# Patient Record
Sex: MALE | Race: WHITE | NOT HISPANIC OR LATINO | Employment: UNEMPLOYED | URBAN - METROPOLITAN AREA
[De-identification: names, ages, dates, MRNs, and addresses within clinical notes are randomized per-mention and may not be internally consistent; named-entity substitution may affect disease eponyms.]

---

## 2024-06-03 ENCOUNTER — OFFICE VISIT (OUTPATIENT)
Dept: FAMILY MEDICINE CLINIC | Facility: CLINIC | Age: 24
End: 2024-06-03
Payer: COMMERCIAL

## 2024-06-03 VITALS
BODY MASS INDEX: 35 KG/M2 | RESPIRATION RATE: 18 BRPM | HEIGHT: 71 IN | HEART RATE: 83 BPM | DIASTOLIC BLOOD PRESSURE: 76 MMHG | SYSTOLIC BLOOD PRESSURE: 122 MMHG | WEIGHT: 250 LBS | OXYGEN SATURATION: 96 %

## 2024-06-03 DIAGNOSIS — Z00.00 HEALTH MAINTENANCE EXAMINATION: ICD-10-CM

## 2024-06-03 DIAGNOSIS — F41.9 ANXIETY: Primary | ICD-10-CM

## 2024-06-03 DIAGNOSIS — R63.5 WEIGHT GAIN: ICD-10-CM

## 2024-06-03 DIAGNOSIS — R40.0 DAYTIME SOMNOLENCE: ICD-10-CM

## 2024-06-03 PROCEDURE — 99204 OFFICE O/P NEW MOD 45 MIN: CPT | Performed by: NURSE PRACTITIONER

## 2024-06-03 RX ORDER — FLUOXETINE 10 MG/1
10 CAPSULE ORAL DAILY
Qty: 90 CAPSULE | Refills: 1 | Status: SHIPPED | OUTPATIENT
Start: 2024-06-03

## 2024-06-03 RX ORDER — FLUOXETINE HYDROCHLORIDE 20 MG/1
1 CAPSULE ORAL DAILY
COMMUNITY
Start: 2024-05-13 | End: 2024-06-03

## 2024-06-03 NOTE — PATIENT INSTRUCTIONS
Discharge instructions    Lets decrease fluoxetine down to 10 mg daily, you should do nicely with this let me know if you have difficulty or rebound anxiety    Better sleep habits, start doing sleep hygiene  Good sleep habits avoid big meals before bedtime, but also during the day avoid naps during the day if you must and it still early a power nap no more than 20 minutes but typically avoid power naps  8 hours average sleep      Sleep study      Slow steady changes with your diet no crash diets do not work  Healthy heart Mediterranean  Chicken fish, greatly reduced breads Posta sweets,  Casseroles preprepared meals, red meat limits 4 to 6 ounces once a week or so  Consider intermittent fasting 64 ounces water daily slow steady changes for long-lasting weight loss, just improve your health and diet slowly    Encourage you to get out of the house more just go for walks something simple with some things when you schedule    As long as you are doing well, follow-up  4 months,  But if you could update me your progress in 1 month please should you have increased depression severe agitation suicidal ideation emergency room good communication family friends      Alternate fluoxetine 10 mg with 20 mg for 2 weeks then decrease to 10 mg daily for nice smooth transition will see how you are doing now 6 weeks out if you could update me in 6 weeks or so let me know how you doing overall, otherwise I will see you back in 4 months

## 2024-06-03 NOTE — PROGRESS NOTES
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  This tends to be yawning throughout the day,  He has no history of loud snoring but his father does have obstructive sleep apnea.  He is in generally good health no diabetes hypertension no seizure disorder, he is making good decisions in his life.    Social history as above, no tobacco drug alcohol abuse,    Past medical history, as above healthy,    Family history  Grandfather prostate cancer      Anxiety      Objective   Vital Signs:  /76   Pulse 83   Resp 18   Ht 180.3 cm (71\")   Wt 113 kg (250 lb)   SpO2 96%   BMI 34.87 kg/m²   Estimated body mass index is 34.87 kg/m² as calculated from the following:    Height as of this encounter: 180.3 cm (71\").    Weight as of this encounter: 113 kg (250 lb).          Physical Exam  Vitals reviewed.   Constitutional:       Appearance: Normal appearance. He is well-developed.   HENT:      Head: Normocephalic.      Nose: Nose normal.   Eyes:      General: No scleral icterus.     Conjunctiva/sclera: Conjunctivae normal.      Pupils: Pupils are equal, round, and reactive to light.   Neck:      Thyroid: No " thyromegaly.      Vascular: No JVD.   Cardiovascular:      Rate and Rhythm: Normal rate and regular rhythm.      Heart sounds: Normal heart sounds. No murmur heard.     No friction rub. No gallop.   Pulmonary:      Effort: Pulmonary effort is normal. No respiratory distress.      Breath sounds: Normal breath sounds. No stridor. No wheezing or rales.   Abdominal:      General: Bowel sounds are normal. There is no distension.      Palpations: Abdomen is soft.      Tenderness: There is no abdominal tenderness.      Comments: No hepatosplenomegaly, no ascites,   Musculoskeletal:         General: No tenderness.      Cervical back: Neck supple.   Lymphadenopathy:      Cervical: No cervical adenopathy.   Skin:     General: Skin is warm and dry.      Findings: No erythema or rash.   Neurological:      Mental Status: He is alert and oriented to person, place, and time.      Deep Tendon Reflexes: Reflexes are normal and symmetric.   Psychiatric:         Behavior: Behavior normal.         Thought Content: Thought content normal.         Judgment: Judgment normal.        Result Review :                Assessment and Plan   Diagnoses and all orders for this visit:    1. Anxiety (Primary)  -     TSH Rfx On Abnormal To Free T4    2. Daytime somnolence  -     Ambulatory Referral to Sleep Medicine    3. Weight gain    4. Health maintenance examination  -     CBC & Differential  -     Comprehensive Metabolic Panel  -     Lipid Panel With LDL / HDL Ratio  -     TSH Rfx On Abnormal To Free T4  -     Urinalysis With Microscopic If Indicated (No Culture) - Urine, Clean Catch  -     Hemoglobin A1c    Other orders  -     FLUoxetine (PROzac) 10 MG capsule; Take 1 capsule by mouth Daily.  Dispense: 90 capsule; Refill: 1             Follow Up   Return in about 4 months (around 10/3/2024) for Labs today.  Patient was given instructions and counseling regarding his condition or for health maintenance advice. Please see specific information  pulled into the AVS if appropriate.     Patient Instructions     Discharge instructions    Lets decrease fluoxetine down to 10 mg daily, you should do nicely with this let me know if you have difficulty or rebound anxiety    Better sleep habits, start doing sleep hygiene  Good sleep habits avoid big meals before bedtime, but also during the day avoid naps during the day if you must and it still early a power nap no more than 20 minutes but typically avoid power naps  8 hours average sleep      Sleep study      Slow steady changes with your diet no crash diets do not work  Healthy heart Mediterranean  Chicken fish, greatly reduced breads Posta sweets,  Casseroles preprepared meals, red meat limits 4 to 6 ounces once a week or so  Consider intermittent fasting 64 ounces water daily slow steady changes for long-lasting weight loss, just improve your health and diet slowly    Encourage you to get out of the house more just go for walks something simple with some things when you schedule    As long as you are doing well, follow-up  4 months,  But if you could update me your progress in 1 month please should you have increased depression severe agitation suicidal ideation emergency room good communication family friends      Alternate fluoxetine 10 mg with 20 mg for 2 weeks then decrease to 10 mg daily for nice smooth transition will see how you are doing now 6 weeks out if you could update me in 6 weeks or so let me know how you doing overall, otherwise I will see you back in 4 months

## 2024-06-04 LAB
ALBUMIN SERPL-MCNC: 4.7 G/DL (ref 3.5–5.2)
ALBUMIN/GLOB SERPL: 1.4 G/DL
ALP SERPL-CCNC: 117 U/L (ref 39–117)
ALT SERPL-CCNC: 56 U/L (ref 1–41)
APPEARANCE UR: CLEAR
AST SERPL-CCNC: 33 U/L (ref 1–40)
BASOPHILS # BLD AUTO: 0.06 10*3/MM3 (ref 0–0.2)
BASOPHILS NFR BLD AUTO: 0.6 % (ref 0–1.5)
BILIRUB SERPL-MCNC: 0.4 MG/DL (ref 0–1.2)
BILIRUB UR QL STRIP: NEGATIVE
BUN SERPL-MCNC: 12 MG/DL (ref 6–20)
BUN/CREAT SERPL: 12.2 (ref 7–25)
CALCIUM SERPL-MCNC: 9.9 MG/DL (ref 8.6–10.5)
CHLORIDE SERPL-SCNC: 102 MMOL/L (ref 98–107)
CHOLEST SERPL-MCNC: 178 MG/DL (ref 0–200)
CO2 SERPL-SCNC: 24.9 MMOL/L (ref 22–29)
COLOR UR: YELLOW
CREAT SERPL-MCNC: 0.98 MG/DL (ref 0.76–1.27)
EGFRCR SERPLBLD CKD-EPI 2021: 111.8 ML/MIN/1.73
EOSINOPHIL # BLD AUTO: 0.23 10*3/MM3 (ref 0–0.4)
EOSINOPHIL NFR BLD AUTO: 2.2 % (ref 0.3–6.2)
ERYTHROCYTE [DISTWIDTH] IN BLOOD BY AUTOMATED COUNT: 13.3 % (ref 12.3–15.4)
GLOBULIN SER CALC-MCNC: 3.3 GM/DL
GLUCOSE SERPL-MCNC: 89 MG/DL (ref 65–99)
GLUCOSE UR QL STRIP: NEGATIVE
HBA1C MFR BLD: 5.4 % (ref 4.8–5.6)
HCT VFR BLD AUTO: 43.7 % (ref 37.5–51)
HDLC SERPL-MCNC: 51 MG/DL (ref 40–60)
HGB BLD-MCNC: 14.8 G/DL (ref 13–17.7)
HGB UR QL STRIP: NEGATIVE
IMM GRANULOCYTES # BLD AUTO: 0.03 10*3/MM3 (ref 0–0.05)
IMM GRANULOCYTES NFR BLD AUTO: 0.3 % (ref 0–0.5)
KETONES UR QL STRIP: NEGATIVE
LDLC SERPL CALC-MCNC: 107 MG/DL (ref 0–100)
LDLC/HDLC SERPL: 2.06 {RATIO}
LEUKOCYTE ESTERASE UR QL STRIP: NEGATIVE
LYMPHOCYTES # BLD AUTO: 2.69 10*3/MM3 (ref 0.7–3.1)
LYMPHOCYTES NFR BLD AUTO: 25.6 % (ref 19.6–45.3)
MCH RBC QN AUTO: 28.1 PG (ref 26.6–33)
MCHC RBC AUTO-ENTMCNC: 33.9 G/DL (ref 31.5–35.7)
MCV RBC AUTO: 83.1 FL (ref 79–97)
MONOCYTES # BLD AUTO: 0.98 10*3/MM3 (ref 0.1–0.9)
MONOCYTES NFR BLD AUTO: 9.3 % (ref 5–12)
NEUTROPHILS # BLD AUTO: 6.51 10*3/MM3 (ref 1.7–7)
NEUTROPHILS NFR BLD AUTO: 62 % (ref 42.7–76)
NITRITE UR QL STRIP: NEGATIVE
NRBC BLD AUTO-RTO: 0 /100 WBC (ref 0–0.2)
PH UR STRIP: 7 [PH] (ref 5–8)
PLATELET # BLD AUTO: 450 10*3/MM3 (ref 140–450)
POTASSIUM SERPL-SCNC: 4.7 MMOL/L (ref 3.5–5.2)
PROT SERPL-MCNC: 8 G/DL (ref 6–8.5)
PROT UR QL STRIP: NEGATIVE
RBC # BLD AUTO: 5.26 10*6/MM3 (ref 4.14–5.8)
SODIUM SERPL-SCNC: 141 MMOL/L (ref 136–145)
SP GR UR STRIP: 1.02 (ref 1–1.03)
TRIGL SERPL-MCNC: 109 MG/DL (ref 0–150)
TSH SERPL DL<=0.005 MIU/L-ACNC: 0.95 UIU/ML (ref 0.27–4.2)
UROBILINOGEN UR STRIP-MCNC: NORMAL MG/DL
VLDLC SERPL CALC-MCNC: 20 MG/DL (ref 5–40)
WBC # BLD AUTO: 10.5 10*3/MM3 (ref 3.4–10.8)

## 2024-07-23 ENCOUNTER — OFFICE VISIT (OUTPATIENT)
Dept: SLEEP MEDICINE | Facility: HOSPITAL | Age: 24
End: 2024-07-23
Payer: COMMERCIAL

## 2024-07-23 VITALS — HEIGHT: 71 IN | BODY MASS INDEX: 36.06 KG/M2 | WEIGHT: 257.6 LBS | OXYGEN SATURATION: 98 % | HEART RATE: 92 BPM

## 2024-07-23 DIAGNOSIS — E66.01 MORBID (SEVERE) OBESITY DUE TO EXCESS CALORIES: ICD-10-CM

## 2024-07-23 DIAGNOSIS — E66.09 CLASS 2 OBESITY DUE TO EXCESS CALORIES WITH BODY MASS INDEX (BMI) OF 35.0 TO 35.9 IN ADULT, UNSPECIFIED WHETHER SERIOUS COMORBIDITY PRESENT: ICD-10-CM

## 2024-07-23 DIAGNOSIS — R40.0 DAYTIME SOMNOLENCE: Primary | ICD-10-CM

## 2024-07-23 DIAGNOSIS — G47.33 OSA (OBSTRUCTIVE SLEEP APNEA): ICD-10-CM

## 2024-07-23 PROCEDURE — G0463 HOSPITAL OUTPT CLINIC VISIT: HCPCS

## 2024-07-23 PROCEDURE — 99204 OFFICE O/P NEW MOD 45 MIN: CPT | Performed by: PSYCHIATRY & NEUROLOGY

## 2024-07-23 NOTE — PROGRESS NOTES
"  Reason for Consultation: Daytime sleepiness        Patient Care Team:  Epley, James, APRN as PCP - General (Nurse Practitioner)  Maritza Doe MD, MPH as Consulting Physician (Sleep Medicine)      History of present illness:    Thank you for asking me to see your patient.  The patient is a 24 y.o. 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  He is to be able to go to sleep when he got home at 7 AM and sleep till 3 PM and feel rested but now he finds himself waking up repeatedly and even going back to sleep at 6 PM before he is to go back to work at 10 PM.  His father has sleep apnea and snores loudly this patient does not have a compelling collateral history of the same he has gained about 100 pounds in the past 5 years.  Since they moved to a new house with relatively dark and quiet basement he says its helped a little bit to sleep.  He is at the visit with his mother and she provides concordant collateral history.  Has been on Prozac for over a year and would not appear to have temporally been associate with any changes in his sleep.  He denies tobacco use has 2-3 caffeinated beverages per day no alcohol habitual bedtime is between 7 and 8 AM and he would prefer to get up between 3 to 4 PM and he sometimes feels tired.  He has never had a prior sleep study    Gilman: 3    Data Reviewed: Reviewed his sleep medicine questionnaire and medical chart      PMH:  No past medical history on file.       Allergies:  Patient has no known allergies.     Medication Review:   Current Outpatient Medications on File Prior to Visit   Medication Sig Dispense Refill    FLUoxetine (PROzac) 10 MG capsule Take 1 capsule by mouth Daily. 90 capsule 1     No current facility-administered medications on file prior to visit.         Vital Signs:    Vitals:    07/23/24 0900   Pulse: 92   SpO2: 98%   Weight: 117 kg (257 lb 9.6 oz)   Height: 180.3 cm (71\")        Body mass index is 35.93 kg/m².  Neck " Circumference: 17.5 inches  .BMIFOLLOWUP  Class 2 Severe Obesity (BMI >=35 and <=39.9). Obesity-related health conditions include the following: obstructive sleep apnea. Obesity is newly identified. BMI is is above average; BMI management plan is completed. We discussed portion control and increasing exercise.      Physical Exam:    Constitutional:  Well developed 24 y.o. male that appears in no apparent distress.  Awake & oriented times 3.  Normal mood with normal recent and remote memory and normal judgement.  Eyes:  Conjunctivae normal.  Oropharynx: Moist mucous membranes without exudate and Mallampati 4 with macroglossia  Neck: Trachea midline  Respiratory: Effort is not labored  Cardiovascular: Radial pulse regular  Musculoskeletal: Gait appears normal, no digital clubbing evident, no pre-tibial edema        Impression:   Encounter Diagnoses   Name Primary?    Daytime somnolence Yes    Morbid (severe) obesity due to excess calories     Class 2 obesity due to excess calories with body mass index (BMI) of 35.0 to 35.9 in adult, unspecified whether serious comorbidity present     NOHEMI (obstructive sleep apnea)      Patient's BMI is Body mass index is 35.93 kg/m².    I think given the patient's anatomy and the description it is quite likely that he will have moderately severe obstructive sleep apnea or worse.  Home    Plan:    Home sleep apnea test    The patient should practice good sleep hygiene measures.      Weight loss might be beneficial in this patient who has a Body mass index is 35.93 kg/m².      Pathophysiology of NOHEMI described to the patient.  Cardiovascular complications of untreated NOHEMI also reviewed.      The patient was cautioned about the dangers of drowsy driving.    Desean Doe MD  Sleep Medicine  07/23/24  10:51 EDT

## 2024-08-01 ENCOUNTER — TELEPHONE (OUTPATIENT)
Dept: SLEEP MEDICINE | Facility: HOSPITAL | Age: 24
End: 2024-08-01
Payer: COMMERCIAL

## 2024-08-02 ENCOUNTER — HOSPITAL ENCOUNTER (OUTPATIENT)
Dept: SLEEP MEDICINE | Facility: HOSPITAL | Age: 24
Discharge: HOME | End: 2024-08-02
Admitting: PSYCHIATRY & NEUROLOGY
Payer: COMMERCIAL

## 2024-08-02 PROCEDURE — 95806 SLEEP STUDY UNATT&RESP EFFT: CPT

## 2024-08-06 DIAGNOSIS — G47.33 OBSTRUCTIVE SLEEP APNEA, ADULT: Primary | ICD-10-CM

## 2024-08-06 PROCEDURE — 95806 SLEEP STUDY UNATT&RESP EFFT: CPT | Performed by: PSYCHIATRY & NEUROLOGY

## 2024-08-08 ENCOUNTER — TELEPHONE (OUTPATIENT)
Dept: SLEEP MEDICINE | Facility: HOSPITAL | Age: 24
End: 2024-08-08
Payer: COMMERCIAL

## 2024-08-08 NOTE — TELEPHONE ENCOUNTER
..Spoke with patient about sleep study results , sending orders to [facility], compliance follow up schedule9/9/24.

## 2024-10-07 ENCOUNTER — OFFICE VISIT (OUTPATIENT)
Dept: FAMILY MEDICINE CLINIC | Facility: CLINIC | Age: 24
End: 2024-10-07
Payer: COMMERCIAL

## 2024-10-07 VITALS
WEIGHT: 258.9 LBS | BODY MASS INDEX: 36.24 KG/M2 | HEART RATE: 86 BPM | DIASTOLIC BLOOD PRESSURE: 86 MMHG | SYSTOLIC BLOOD PRESSURE: 122 MMHG | HEIGHT: 71 IN | OXYGEN SATURATION: 97 %

## 2024-10-07 DIAGNOSIS — G47.33 MILD OBSTRUCTIVE SLEEP APNEA: ICD-10-CM

## 2024-10-07 DIAGNOSIS — F41.9 ANXIETY: Primary | ICD-10-CM

## 2024-10-07 PROCEDURE — 99213 OFFICE O/P EST LOW 20 MIN: CPT | Performed by: NURSE PRACTITIONER

## 2024-10-07 RX ORDER — FLUOXETINE 10 MG/1
10 CAPSULE ORAL DAILY
Qty: 90 CAPSULE | Refills: 1 | Status: SHIPPED | OUTPATIENT
Start: 2024-10-07

## 2024-10-07 NOTE — PATIENT INSTRUCTIONS
Discharge instructions continue fluoxetine you doing spectacular,  Excellent idea I will start a workout routine,  Challenge you 10 to 20 pounds over the  next 6 to 12 months  Slow steady changes, more fiber, smaller portion size,  No diets that do not work just good health consider intermittent fasting      Lungs are doing well and feeling good I will see back in 4 months, sooner for any problems

## 2024-10-07 NOTE — PROGRESS NOTES
"Chief Complaint  Anxiety    Subjective        Laron Dixon presents to [facility]  History of Present Illness  Very pleasant gentleman here today follow-up anxiety, he was having some daytime somnolence, and discussions about fluoxetine the best dose for him, we decreased it down to 10 mg daily he is doing well with this, he has a potentially stressful job as a   He is doing well with his occupation, he does work swing shift and night shift,  And attributes to some of his daytime somnolence due to his sleeping schedule he feels better when his schedule is different did not do well with the mask, but also did not notice it helped him a whole lot either his numbers were borderline with this testing obstructive sleep apnea, he prefers no mask into management otherwise he does sleep on his side or his abdomen which helps      Social history no drug alcohol tobacco abuse he is making good decisions in his life,  Plans on going back to the gym soon  Anxiety      Objective   Vital Signs:  /86 (BP Location: Left arm, Patient Position: Sitting, Cuff Size: Large Adult)   Pulse 86   Ht 180.3 cm (71\")   Wt 117 kg (258 lb 14.4 oz)   SpO2 97%   BMI 36.11 kg/m²   Estimated body mass index is 36.11 kg/m² as calculated from the following:    Height as of this encounter: 180.3 cm (71\").    Weight as of this encounter: 117 kg (258 lb 14.4 oz).            Physical Exam  Constitutional:       General: He is not in acute distress.     Appearance: Normal appearance. He is not ill-appearing, toxic-appearing or diaphoretic.   Eyes:      Conjunctiva/sclera: Conjunctivae normal.      Pupils: Pupils are equal, round, and reactive to light.   Pulmonary:      Effort: Pulmonary effort is normal. No respiratory distress.   Skin:     General: Skin is warm and dry.   Neurological:      General: No focal deficit present.      Mental Status: Mental status is at baseline.   Psychiatric:         Mood " and Affect: Mood normal.         Behavior: Behavior normal.         Thought Content: Thought content normal.         Judgment: Judgment normal.        Result Review :                Assessment and Plan   Diagnoses and all orders for this visit:    1. Anxiety (Primary)    2. Mild obstructive sleep apnea  Comments:  Did not tolerate mask well did not feel better, opted no mask    Other orders  -     FLUoxetine (PROzac) 10 MG capsule; Take 1 capsule by mouth Daily.  Dispense: 90 capsule; Refill: 1             Follow Up   Return in about 4 months (around 2/7/2025).  Patient was given instructions and counseling regarding his condition or for health maintenance advice. Please see specific information pulled into the AVS if appropriate.     Patient Instructions   Discharge instructions continue fluoxetine you doing spectacular,  Excellent idea I will start a workout routine,  Challenge you 10 to 20 pounds over the  next 6 to 12 months  Slow steady changes, more fiber, smaller portion size,  No diets that do not work just good health consider intermittent fasting      Lungs are doing well and feeling good I will see back in 4 months, sooner for any problems

## 2024-11-14 ENCOUNTER — OFFICE VISIT (OUTPATIENT)
Dept: FAMILY MEDICINE CLINIC | Facility: CLINIC | Age: 24
End: 2024-11-14
Payer: COMMERCIAL

## 2024-11-14 VITALS
OXYGEN SATURATION: 96 % | BODY MASS INDEX: 36.4 KG/M2 | WEIGHT: 260 LBS | SYSTOLIC BLOOD PRESSURE: 130 MMHG | TEMPERATURE: 98.3 F | DIASTOLIC BLOOD PRESSURE: 84 MMHG | HEIGHT: 71 IN | HEART RATE: 101 BPM

## 2024-11-14 DIAGNOSIS — J02.0 STREP THROAT: Primary | ICD-10-CM

## 2024-11-14 PROCEDURE — 99214 OFFICE O/P EST MOD 30 MIN: CPT | Performed by: NURSE PRACTITIONER

## 2024-11-14 RX ORDER — AMOXICILLIN 500 MG/1
500 CAPSULE ORAL 2 TIMES DAILY
Qty: 20 CAPSULE | Refills: 0 | Status: SHIPPED | OUTPATIENT
Start: 2024-11-14

## 2024-11-14 NOTE — PATIENT INSTRUCTIONS
Probably strep.  Test was borderline positive  Amoxil twice a day push fluids change toothbrush in 2 days, if severe pain unable to swallow lethargy high fever chest pain shortness of breath emergency room  Ibuprofen 600 or 800     3 times a day until better if you develop more runny nose cough congestion in the next couple days then this would suggest more of a viral illness nonetheless hydrate well and treat symptoms and update me your progress if not improving next week        Okay to return to work Saturday

## 2024-11-14 NOTE — PROGRESS NOTES
"Chief Complaint  URI (Sore throat started Wednesday morning /Vomiting and congestion started Tuesday )    Subjective        Laron Dixon presents to [facility]  History of Present Illness  Pleasant gentleman complains of couple days of sore throat, vomited once, no new nasal congestion no new cough  No recent headache no fever body aches, had strep quite a few years ago,  Somebody at work had strep\        URI       Objective   Vital Signs:  /84   Pulse 101   Temp 98.3 °F (36.8 °C) (Oral)   Ht 180.3 cm (71\")   Wt 118 kg (260 lb)   SpO2 96%   BMI 36.26 kg/m²   Estimated body mass index is 36.26 kg/m² as calculated from the following:    Height as of this encounter: 180.3 cm (71\").    Weight as of this encounter: 118 kg (260 lb).            Physical Exam  Constitutional:       General: He is not in acute distress.     Appearance: Normal appearance. He is not ill-appearing, toxic-appearing or diaphoretic.   HENT:      Ears:      Comments: Partial wax impaction especially the left TMs are clear turbinates congested pharynx is clear no excessive postnasal drip trace erythema posterior pharynx and tonsils, uvula midline no abscess no exudate neck supple     Nose: Nose normal. Congestion present.      Mouth/Throat:      Mouth: Mucous membranes are moist.      Pharynx: Oropharynx is clear. No oropharyngeal exudate or posterior oropharyngeal erythema.   Eyes:      Conjunctiva/sclera: Conjunctivae normal.      Pupils: Pupils are equal, round, and reactive to light.   Cardiovascular:      Rate and Rhythm: Normal rate and regular rhythm.   Pulmonary:      Effort: Pulmonary effort is normal. No respiratory distress.   Lymphadenopathy:      Cervical: No cervical adenopathy.   Skin:     General: Skin is warm and dry.      Capillary Refill: Capillary refill takes less than 2 seconds.   Neurological:      General: No focal deficit present.      Mental Status: Mental status is at baseline. "   Psychiatric:         Mood and Affect: Mood normal.         Thought Content: Thought content normal.         Judgment: Judgment normal.        Result Review :                Assessment and Plan   Diagnoses and all orders for this visit:    1. Strep throat (Primary)    Other orders  -     amoxicillin (AMOXIL) 500 MG capsule; Take 1 capsule by mouth 2 (Two) Times a Day.  Dispense: 20 capsule; Refill: 0             Follow Up   Return rtn work Sat note plse.  Patient was given instructions and counseling regarding his condition or for health maintenance advice. Please see specific information pulled into the AVS if appropriate.     Patient Instructions   Probably strep.  Test was borderline positive  Amoxil twice a day push fluids change toothbrush in 2 days, if severe pain unable to swallow lethargy high fever chest pain shortness of breath emergency room  Ibuprofen 600 or 800     3 times a day until better if you develop more runny nose cough congestion in the next couple days then this would suggest more of a viral illness nonetheless hydrate well and treat symptoms and update me your progress if not improving next week        Okay to return to work Saturday

## 2025-02-17 ENCOUNTER — OFFICE VISIT (OUTPATIENT)
Dept: FAMILY MEDICINE CLINIC | Facility: CLINIC | Age: 25
End: 2025-02-17
Payer: COMMERCIAL

## 2025-02-17 VITALS
DIASTOLIC BLOOD PRESSURE: 78 MMHG | HEIGHT: 71 IN | SYSTOLIC BLOOD PRESSURE: 126 MMHG | BODY MASS INDEX: 37.34 KG/M2 | WEIGHT: 266.7 LBS | HEART RATE: 95 BPM | OXYGEN SATURATION: 97 %

## 2025-02-17 DIAGNOSIS — E66.812 CLASS 2 SEVERE OBESITY DUE TO EXCESS CALORIES WITH SERIOUS COMORBIDITY AND BODY MASS INDEX (BMI) OF 37.0 TO 37.9 IN ADULT: ICD-10-CM

## 2025-02-17 DIAGNOSIS — F41.9 ANXIETY: Primary | ICD-10-CM

## 2025-02-17 DIAGNOSIS — E66.01 CLASS 2 SEVERE OBESITY DUE TO EXCESS CALORIES WITH SERIOUS COMORBIDITY AND BODY MASS INDEX (BMI) OF 37.0 TO 37.9 IN ADULT: ICD-10-CM

## 2025-02-17 DIAGNOSIS — Z00.00 HEALTH MAINTENANCE EXAMINATION: ICD-10-CM

## 2025-02-17 PROCEDURE — 99214 OFFICE O/P EST MOD 30 MIN: CPT | Performed by: NURSE PRACTITIONER

## 2025-02-17 RX ORDER — FLUOXETINE 10 MG/1
10 CAPSULE ORAL DAILY
Qty: 90 CAPSULE | Refills: 1 | Status: SHIPPED | OUTPATIENT
Start: 2025-02-17

## 2025-02-17 RX ORDER — CLINDAMYCIN PHOSPHATE 10 MG/G
1 GEL TOPICAL 2 TIMES DAILY
Qty: 180 G | Refills: 3 | Status: SHIPPED | OUTPATIENT
Start: 2025-02-17

## 2025-02-17 NOTE — PATIENT INSTRUCTIONS
Discharge instruction    Continue healthy diet regular exercise, decrease breads Posta sweets, walking daily but decrease portion control to  Get slow steady weight loss, we need to repeat your liver test before next appointment if still elevated could be fatty liver?  Will refer you to gastro and get an ultrasound if so    Continue present medication you are doing great,  Thankfully no severe sleep apnea    Good acting care, a simple gentle scrub once a day in the shower  Followed by some oil free acne moisturizer care from [employer] or similar with sal acid   acid or benzyl peroxide   Low potency,  Mix with a thin layer of benzyl peroxide gel as well and over several weeks or months that should continue to improve if acute inflammation you may need oral steroids or see dermatology anytime    Follow-up physical 4 months, and lab

## 2025-02-17 NOTE — PROGRESS NOTES
"Chief Complaint  Follow-up (4 mo)    Subjective        Laron Dixon presents to 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  He has some snoring, intermittently some daytime somnolence is better mostly, if he changes sleep position, he does well and not having typically daytime somnolence if he gets his rest  Did  a little bit of weight since last visit gradual increase in weight over  The last several years,  Likes to drink milk but not excessively  Motivated for his health  No drug alcohol tobacco abuse diet and exercise    On physical exam noted he has some acne on his back patient reports this quite a few years is actually better        Objective   Vital Signs:  /78 (BP Location: Left arm, Patient Position: Sitting, Cuff Size: Large Adult)   Pulse 95   Ht 180.3 cm (70.98\")   Wt 121 kg (266 lb 11.2 oz)   SpO2 97%   BMI 37.21 kg/m²   Estimated body mass index is 37.21 kg/m² as calculated from the following:    Height as of this encounter: 180.3 cm (70.98\").    Weight as of this encounter: 121 kg (266 lb 11.2 oz).            Physical Exam  Vitals reviewed.   Constitutional:       General: He is not in acute distress.     Appearance: Normal appearance. He is well-developed. He is not ill-appearing, toxic-appearing or diaphoretic.   HENT:      Head: Normocephalic.      Nose: Nose normal.   Eyes:      General: No scleral icterus.     Conjunctiva/sclera: Conjunctivae normal.      Pupils: Pupils are equal, round, and reactive to light.   Neck:      Thyroid: No thyromegaly.      Vascular: No JVD.   Cardiovascular:      Rate and Rhythm: Normal rate and regular rhythm.   Pulmonary:      Effort: No respiratory distress.   Abdominal:      General: Bowel sounds are normal. " There is no distension.      Palpations: Abdomen is soft.      Tenderness: There is no abdominal tenderness.      Comments: No hepatosplenomegaly, no ascites,   Musculoskeletal:         General: No tenderness.      Cervical back: Neck supple.   Lymphadenopathy:      Cervical: No cervical adenopathy.   Skin:     General: Skin is warm and dry.      Findings: No erythema or rash.      Comments: Mid and upper back mild to moderate acne, some old scarring,  No cellulitis or severe disease  Face is fairly clear   Neurological:      General: No focal deficit present.      Mental Status: He is alert and oriented to person, place, and time. Mental status is at baseline.      Deep Tendon Reflexes: Reflexes are normal and symmetric.   Psychiatric:         Mood and Affect: Mood normal.         Behavior: Behavior normal.         Thought Content: Thought content normal.         Judgment: Judgment normal.        Result Review :                Assessment and Plan   Diagnoses and all orders for this visit:    1. Anxiety (Primary)    2. Health maintenance examination  -     CBC & Differential; Future  -     Comprehensive Metabolic Panel; Future  -     Lipid Panel With LDL / HDL Ratio; Future  -     TSH Rfx On Abnormal To Free T4; Future  -     Urinalysis With Culture If Indicated -; Future    3. Class 2 severe obesity due to excess calories with serious comorbidity and body mass index (BMI) of 37.0 to 37.9 in adult    Other orders  -     clindamycin (Clindamycin 1% external gel) 1 % gel; Apply 1 Application topically to the appropriate area as directed 2 (Two) Times a Day. Use sparingly to affected area  Dispense: 180 g; Refill: 3  -     FLUoxetine (PROzac) 10 MG capsule; Take 1 capsule by mouth Daily.  Dispense: 90 capsule; Refill: 1             Follow Up   Return for Annual physical, Labs before next visit.  Patient was given instructions and counseling regarding his condition or for health maintenance advice. Please see specific  information pulled into the AVS if appropriate.       Patient to focus on diet more, lots of vegetables taken for 64 ounces water daily focus on calories around 1500 -1800-calorie   intermittent fasting    Milk ok for  adults moderation taken account for calories   Keep calories 1500 less than 1800    If patient still struggling consider Wegovy Zepbound  Gastric sleeve Qsymia possibly,        Patient Instructions   Discharge instruction    Continue healthy diet regular exercise, decrease breads Posta sweets, walking daily but decrease portion control to  Get slow steady weight loss, we need to repeat your liver test before next appointment if still elevated could be fatty liver?  Will refer you to gastro and get an ultrasound if so    Continue present medication you are doing great,  Thankfully no severe sleep apnea    Good acting care, a simple gentle scrub once a day in the shower  Followed by some oil free acne moisturizer care from Neutrogena or similar with sal acid   acid or benzyl peroxide   Low potency,  Mix with a thin layer of benzyl peroxide gel as well and over several weeks or months that should continue to improve if acute inflammation you may need oral steroids or see dermatology anytime    Follow-up physical 4 months, and lab

## 2025-06-12 ENCOUNTER — TELEPHONE (OUTPATIENT)
Dept: FAMILY MEDICINE CLINIC | Facility: CLINIC | Age: 25
End: 2025-06-12
Payer: COMMERCIAL

## 2025-06-12 DIAGNOSIS — Z11.59 NEED FOR HEPATITIS C SCREENING TEST: ICD-10-CM

## 2025-06-12 DIAGNOSIS — Z00.00 HEALTH MAINTENANCE EXAMINATION: ICD-10-CM

## 2025-06-12 PROBLEM — L05.91 PILONIDAL CYST: Status: ACTIVE | Noted: 2021-06-14

## 2025-06-13 DIAGNOSIS — Z11.59 NEED FOR HEPATITIS C SCREENING TEST: ICD-10-CM

## 2025-06-17 LAB
ALBUMIN SERPL-MCNC: 4.6 G/DL (ref 3.5–5.2)
ALBUMIN/GLOB SERPL: 1.3 G/DL
ALP SERPL-CCNC: 113 U/L (ref 39–117)
ALT SERPL-CCNC: 55 U/L (ref 1–41)
AST SERPL-CCNC: 34 U/L (ref 1–40)
BASOPHILS # BLD AUTO: 0.08 10*3/MM3 (ref 0–0.2)
BASOPHILS NFR BLD AUTO: 0.7 % (ref 0–1.5)
BILIRUB SERPL-MCNC: 0.3 MG/DL (ref 0–1.2)
BUN SERPL-MCNC: 10 MG/DL (ref 6–20)
BUN/CREAT SERPL: 9.3 (ref 7–25)
CALCIUM SERPL-MCNC: 9.9 MG/DL (ref 8.6–10.5)
CHLORIDE SERPL-SCNC: 104 MMOL/L (ref 98–107)
CHOLEST SERPL-MCNC: 183 MG/DL (ref 0–200)
CO2 SERPL-SCNC: 25.7 MMOL/L (ref 22–29)
CREAT SERPL-MCNC: 1.07 MG/DL (ref 0.76–1.27)
EGFRCR SERPLBLD CKD-EPI 2021: 100 ML/MIN/1.73
EOSINOPHIL # BLD AUTO: 0.27 10*3/MM3 (ref 0–0.4)
EOSINOPHIL NFR BLD AUTO: 2.5 % (ref 0.3–6.2)
ERYTHROCYTE [DISTWIDTH] IN BLOOD BY AUTOMATED COUNT: 12.7 % (ref 12.3–15.4)
GLOBULIN SER CALC-MCNC: 3.6 GM/DL
GLUCOSE SERPL-MCNC: 96 MG/DL (ref 65–99)
HCT VFR BLD AUTO: 42.8 % (ref 37.5–51)
HCV IGG SERPL QL IA: NON REACTIVE
HDLC SERPL-MCNC: 50 MG/DL (ref 40–60)
HGB BLD-MCNC: 14.2 G/DL (ref 13–17.7)
IMM GRANULOCYTES # BLD AUTO: 0.03 10*3/MM3 (ref 0–0.05)
IMM GRANULOCYTES NFR BLD AUTO: 0.3 % (ref 0–0.5)
LDLC SERPL CALC-MCNC: 120 MG/DL (ref 0–100)
LDLC/HDLC SERPL: 2.39 {RATIO}
LYMPHOCYTES # BLD AUTO: 4.72 10*3/MM3 (ref 0.7–3.1)
LYMPHOCYTES NFR BLD AUTO: 43.5 % (ref 19.6–45.3)
MCH RBC QN AUTO: 27.8 PG (ref 26.6–33)
MCHC RBC AUTO-ENTMCNC: 33.2 G/DL (ref 31.5–35.7)
MCV RBC AUTO: 83.9 FL (ref 79–97)
MONOCYTES # BLD AUTO: 0.77 10*3/MM3 (ref 0.1–0.9)
MONOCYTES NFR BLD AUTO: 7.1 % (ref 5–12)
NEUTROPHILS # BLD AUTO: 4.99 10*3/MM3 (ref 1.7–7)
NEUTROPHILS NFR BLD AUTO: 45.9 % (ref 42.7–76)
NRBC BLD AUTO-RTO: 0 /100 WBC (ref 0–0.2)
PLATELET # BLD AUTO: 447 10*3/MM3 (ref 140–450)
POTASSIUM SERPL-SCNC: 4.8 MMOL/L (ref 3.5–5.2)
PROT SERPL-MCNC: 8.2 G/DL (ref 6–8.5)
RBC # BLD AUTO: 5.1 10*6/MM3 (ref 4.14–5.8)
SODIUM SERPL-SCNC: 141 MMOL/L (ref 136–145)
TRIGL SERPL-MCNC: 68 MG/DL (ref 0–150)
TSH SERPL DL<=0.005 MIU/L-ACNC: 2.27 UIU/ML (ref 0.27–4.2)
UNABLE TO VOID: NORMAL
VLDLC SERPL CALC-MCNC: 13 MG/DL (ref 5–40)
WBC # BLD AUTO: 10.86 10*3/MM3 (ref 3.4–10.8)

## 2025-06-23 ENCOUNTER — OFFICE VISIT (OUTPATIENT)
Dept: FAMILY MEDICINE CLINIC | Facility: CLINIC | Age: 25
End: 2025-06-23
Payer: COMMERCIAL

## 2025-06-23 VITALS
WEIGHT: 265.2 LBS | DIASTOLIC BLOOD PRESSURE: 80 MMHG | OXYGEN SATURATION: 99 % | SYSTOLIC BLOOD PRESSURE: 138 MMHG | BODY MASS INDEX: 37.13 KG/M2 | HEIGHT: 71 IN | HEART RATE: 97 BPM

## 2025-06-23 DIAGNOSIS — F41.9 ANXIETY: ICD-10-CM

## 2025-06-23 DIAGNOSIS — R03.0 PREHYPERTENSION: ICD-10-CM

## 2025-06-23 DIAGNOSIS — L30.9 DERMATITIS: Primary | ICD-10-CM

## 2025-06-23 DIAGNOSIS — L70.0 ACNE VULGARIS: ICD-10-CM

## 2025-06-23 DIAGNOSIS — E66.01 CLASS 2 SEVERE OBESITY DUE TO EXCESS CALORIES WITH SERIOUS COMORBIDITY AND BODY MASS INDEX (BMI) OF 37.0 TO 37.9 IN ADULT: ICD-10-CM

## 2025-06-23 DIAGNOSIS — Z00.00 HEALTH MAINTENANCE EXAMINATION: ICD-10-CM

## 2025-06-23 DIAGNOSIS — E66.812 CLASS 2 SEVERE OBESITY DUE TO EXCESS CALORIES WITH SERIOUS COMORBIDITY AND BODY MASS INDEX (BMI) OF 37.0 TO 37.9 IN ADULT: ICD-10-CM

## 2025-06-23 DIAGNOSIS — G47.33 MILD OBSTRUCTIVE SLEEP APNEA: ICD-10-CM

## 2025-06-23 DIAGNOSIS — R79.89 ELEVATED LIVER FUNCTION TESTS: ICD-10-CM

## 2025-06-23 PROCEDURE — 99395 PREV VISIT EST AGE 18-39: CPT | Performed by: NURSE PRACTITIONER

## 2025-06-23 RX ORDER — CLINDAMYCIN PHOSPHATE 10 UG/ML
LOTION TOPICAL 2 TIMES DAILY
Qty: 180 ML | Refills: 3 | Status: SHIPPED | OUTPATIENT
Start: 2025-06-23

## 2025-06-23 RX ORDER — DOXYCYCLINE 100 MG/1
100 CAPSULE ORAL 2 TIMES DAILY
Qty: 28 CAPSULE | Refills: 0 | Status: SHIPPED | OUTPATIENT
Start: 2025-06-23

## 2025-06-23 NOTE — PATIENT INSTRUCTIONS
Discharge instructions    For acne,  Use benzyl peroxide or, Neutrogena  Neutrogena oil free acne moisturizer or grapefruit with salicylate acid  Use this in combination with  Clindamycin lotion and over-the-counter   Differin  Lotion    Basically one third of each  Quickly mix together and apply a very thin layer okay to wipe off excess upper chest shoulders and back and face  Looks like you only need a little bit around the nose and you do not need likely any chronic treatment of your face      Once again, I agree with you you are doing great  10 mg every other day for 6 weeks  Update me your progress then we can discontinue this  After you stop the medicine update me on an additional 6 weeks later then I will see you yearly    If you need to see dermatology anytime let me know    If you continue to have any difficulties with your incision follow-up with surgery but today you have no evidence of any abscess  Pilonidal abscess  Looks like you are motivated for health keep this up    Slow steady changes, to avoid fatty liver,  Your liver test are elevated will need a workup to evaluate you for fatty liver

## 2025-06-23 NOTE — PROGRESS NOTES
"Chief Complaint  Annual Exam    Subjective        Laron Dixon presents to [facility]  History of Present Illness  Here today for health maintenance,  No acute problems chest pain shortness of breath doing well night sweats unexplained weight loss chronic anxiety, fluoxetine has helped substantially but, someone recently left his workplace since anxiety has really decreased he is already started decreasing fluoxetine to 10 mg daily  He would like to wean off completely over the next several weeks    No increased depression worry or anxiety or other issues  Chronic acne [address] back and around his cheeks not fill recently medication for this clindamycin gel  Liver test chronic elevated ALT  No alcohol abuse no tobacco abuse or drug abuse making good decisions  professional  Enjoys his job obstructive sleep apnea started CPAP feels better  Obesity, with comorbidities including elevated liver test risk of fatty liver obstructive sleep apnea   up 5 pounds over 6 months        Objective   Vital Signs:  /80   Pulse 97   Ht 180.3 cm (70.98\")   Wt 120 kg (265 lb 3.2 oz)   SpO2 99%   BMI 37.00 kg/m²   Estimated body mass index is 37 kg/m² as calculated from the following:    Height as of this encounter: 180.3 cm (70.98\").    Weight as of this encounter: 120 kg (265 lb 3.2 oz).            Physical Exam  Vitals reviewed.   Constitutional:       General: He is not in acute distress.     Appearance: Normal appearance. He is well-developed. He is obese. He is not ill-appearing, toxic-appearing or diaphoretic.   HENT:      Head: Normocephalic.      Nose: Nose normal.   Eyes:      General: No scleral icterus.     Conjunctiva/sclera: Conjunctivae normal.      Pupils: Pupils are equal, round, and reactive to light.   Neck:      Thyroid: No thyromegaly.      Vascular: No JVD.   Cardiovascular:      Rate and Rhythm: Normal rate and regular rhythm.      Heart sounds: Normal heart " sounds. No murmur heard.     No friction rub. No gallop.   Pulmonary:      Effort: Pulmonary effort is normal. No respiratory distress.      Breath sounds: Normal breath sounds. No stridor. No wheezing or rales.   Abdominal:      General: Bowel sounds are normal. There is no distension.      Palpations: Abdomen is soft.      Tenderness: There is no abdominal tenderness.      Comments: No hepatosplenomegaly, no ascites,   Musculoskeletal:         General: No tenderness.      Cervical back: Neck supple.   Lymphadenopathy:      Cervical: No cervical adenopathy.   Skin:     General: Skin is warm and dry.      Findings: No erythema or rash.      Comments: Acne  Scattered condoms erythemic base, resolving lesions, old scar tissue back and upper shoulders upper chest  Mild to moderate  More milder on his nasal bridge nose upper cheeks discrete without scarring upper buttocks, at incision site skin fold, mild irritation scab with some mild tenderness without surrounding thickening or abscess       Neurological:      General: No focal deficit present.      Mental Status: He is alert and oriented to person, place, and time. Mental status is at baseline.      Deep Tendon Reflexes: Reflexes are normal and symmetric.   Psychiatric:         Mood and Affect: Mood normal.         Behavior: Behavior normal.         Thought Content: Thought content normal.         Judgment: Judgment normal.        Result Review :                Assessment and Plan   Diagnoses and all orders for this visit:    1. Dermatitis (Primary)  Comments:  With scab without evidence of abscess but irritated upper buttock    2. Health maintenance examination    3. Prehypertension    4. Acne vulgaris    5. Elevated liver function tests  Comments:  At risk for fatty liver refer to gastro and ultrasound  Orders:  -     Ambulatory Referral to Gastroenterology  -     US Liver    6. Class 2 severe obesity due to excess calories with serious comorbidity and body mass  index (BMI) of 37.0 to 37.9 in adult    7. Anxiety    8. Mild obstructive sleep apnea    Other orders  -     doxycycline (VIBRAMYCIN) 100 MG capsule; Take 1 capsule by mouth 2 (Two) Times a Day.  Dispense: 28 capsule; Refill: 0  -     clindamycin (CLEOCIN T) 1 % lotion; Apply  topically to the appropriate area as directed 2 (Two) Times a Day. Routinely for acne back shoulders face  Dispense: 180 mL; Refill: 3             Follow Up   Return in about 1 year (around 6/23/2026) for Annual physical, Labs before next visit.  Patient was given instructions and counseling regarding his condition or for health maintenance advice. Please see specific information pulled into the AVS if appropriate.     Doxycycline for the irritation is upper buttock region there is no evidence of any recurrent abscess  This is likely more superficial of the incision line for any underlying abscess although should he have increased pain redness swelling emergency room follow-up with surgery  Skin care clindamycin, over-the-counter topical salicylate acid  Differin gel  Daily care at least once or twice a day  Dermatology if not improving  Encouraged regarding improved diet,  Elevated liver test and the risk of fatty liver and needing to be aggressive at this at a early age to prevent scar tissue cirrhosis  Prehypertension as well hypertension  Not sure if I could reach him here, as I suspect the discussion referral to gastro may have suppressed him a bit?  He will need to see gastroenterology to follow liver test we should be aggressive in discovering why they are elevated if felt to be fatty liver aggressive in therapy treatment at the minimum 5 to 10% weight loss is indicated to decrease risk of developing fibrosis cirrhosis and he may need more?  Calories around 1500 to 1800 maryjo  Previously discussed other treatment options for obesity.          Patient Instructions   Discharge instructions    For acne,  Use benzyl peroxide or,  Neutrogena  Neutrogena oil free acne moisturizer or grapefruit with salicylate acid  Use this in combination with  Clindamycin lotion and over-the-counter   Differin  Lotion    Basically one third of each  Quickly mix together and apply a very thin layer okay to wipe off excess upper chest shoulders and back and face  Looks like you only need a little bit around the nose and you do not need likely any chronic treatment of your face      Once again, I agree with you you are doing great  10 mg every other day for 6 weeks  Update me your progress then we can discontinue this  After you stop the medicine update me on an additional 6 weeks later then I will see you yearly    If you need to see dermatology anytime let me know    If you continue to have any difficulties with your incision follow-up with surgery but today you have no evidence of any abscess  Pilonidal abscess  Looks like you are motivated for health keep this up    Slow steady changes, to avoid fatty liver,  Your liver test are elevated will need a workup to evaluate you for fatty liver

## 2025-07-14 ENCOUNTER — HOSPITAL ENCOUNTER (OUTPATIENT)
Dept: ULTRASOUND IMAGING | Facility: HOSPITAL | Age: 25
Discharge: HOME | End: 2025-07-14
Admitting: NURSE PRACTITIONER
Payer: COMMERCIAL

## 2025-07-14 PROCEDURE — 76705 ECHO EXAM OF ABDOMEN: CPT
